# Patient Record
Sex: FEMALE | Race: BLACK OR AFRICAN AMERICAN | Employment: UNEMPLOYED | URBAN - METROPOLITAN AREA
[De-identification: names, ages, dates, MRNs, and addresses within clinical notes are randomized per-mention and may not be internally consistent; named-entity substitution may affect disease eponyms.]

---

## 2018-01-25 ENCOUNTER — HOSPITAL ENCOUNTER (INPATIENT)
Age: 33
LOS: 3 days | Discharge: HOME | DRG: 812 | End: 2018-01-28
Attending: EMERGENCY MEDICINE | Admitting: EMERGENCY MEDICINE
Payer: MEDICARE

## 2018-01-25 ENCOUNTER — APPOINTMENT (OUTPATIENT)
Dept: GENERAL RADIOLOGY | Age: 33
DRG: 812 | End: 2018-01-25
Attending: EMERGENCY MEDICINE
Payer: MEDICARE

## 2018-01-25 DIAGNOSIS — D57.00 SICKLE CELL ANEMIA WITH CRISIS (HCC): Primary | ICD-10-CM

## 2018-01-25 DIAGNOSIS — D57.01: ICD-10-CM

## 2018-01-25 PROBLEM — D57.1 SICKLE-CELL ANEMIA (HCC): Status: ACTIVE | Noted: 2018-01-25

## 2018-01-25 LAB
ANION GAP SERPL CALC-SCNC: 7 MMOL/L (ref 3–18)
BASOPHILS # BLD: 0 K/UL (ref 0–0.1)
BASOPHILS NFR BLD: 0 % (ref 0–3)
BUN SERPL-MCNC: 16 MG/DL (ref 7–18)
BUN/CREAT SERPL: 23 (ref 12–20)
CALCIUM SERPL-MCNC: 7.8 MG/DL (ref 8.5–10.1)
CHLORIDE SERPL-SCNC: 103 MMOL/L (ref 100–108)
CO2 SERPL-SCNC: 25 MMOL/L (ref 21–32)
CREAT SERPL-MCNC: 0.71 MG/DL (ref 0.6–1.3)
DIFFERENTIAL METHOD BLD: ABNORMAL
EOSINOPHIL # BLD: 0 K/UL (ref 0–0.4)
EOSINOPHIL NFR BLD: 0 % (ref 0–5)
ERYTHROCYTE [DISTWIDTH] IN BLOOD BY AUTOMATED COUNT: 27.5 % (ref 11.6–14.5)
FLUAV AG NPH QL IA: NEGATIVE
FLUBV AG NOSE QL IA: NEGATIVE
GLUCOSE SERPL-MCNC: 92 MG/DL (ref 74–99)
HCG SERPL QL: NEGATIVE
HCT VFR BLD AUTO: 12 % (ref 35–45)
HGB BLD-MCNC: 4.5 G/DL (ref 12–16)
LACTATE BLD-SCNC: 0.4 MMOL/L (ref 0.4–2)
LYMPHOCYTES # BLD: 1.9 K/UL (ref 0.8–3.5)
LYMPHOCYTES NFR BLD: 17 % (ref 20–51)
MCH RBC QN AUTO: 34.6 PG (ref 24–34)
MCHC RBC AUTO-ENTMCNC: 37.5 G/DL (ref 31–37)
MCV RBC AUTO: 92.3 FL (ref 74–97)
MONOCYTES # BLD: 1.5 K/UL (ref 0–1)
MONOCYTES NFR BLD: 13 % (ref 2–9)
MYELOCYTES NFR BLD MANUAL: 1 %
NEUTS SEG # BLD: 7.9 K/UL (ref 1.8–8)
NEUTS SEG NFR BLD: 69 % (ref 42–75)
NRBC BLD-RTO: 8 PER 100 WBC
PLATELET # BLD AUTO: 186 K/UL (ref 135–420)
PMV BLD AUTO: 8.3 FL (ref 9.2–11.8)
POTASSIUM SERPL-SCNC: 4.3 MMOL/L (ref 3.5–5.5)
RBC # BLD AUTO: 1.3 M/UL (ref 4.2–5.3)
RBC MORPH BLD: ABNORMAL
RETICS/RBC NFR AUTO: >23 % (ref 0.5–2.3)
SODIUM SERPL-SCNC: 135 MMOL/L (ref 136–145)
TROPONIN I SERPL-MCNC: <0.02 NG/ML (ref 0–0.04)
WBC # BLD AUTO: 11.4 K/UL (ref 4.6–13.2)

## 2018-01-25 PROCEDURE — 84703 CHORIONIC GONADOTROPIN ASSAY: CPT | Performed by: PHYSICIAN ASSISTANT

## 2018-01-25 PROCEDURE — 36430 TRANSFUSION BLD/BLD COMPNT: CPT

## 2018-01-25 PROCEDURE — 74011250636 HC RX REV CODE- 250/636: Performed by: PHYSICIAN ASSISTANT

## 2018-01-25 PROCEDURE — 84484 ASSAY OF TROPONIN QUANT: CPT | Performed by: PHYSICIAN ASSISTANT

## 2018-01-25 PROCEDURE — 94760 N-INVAS EAR/PLS OXIMETRY 1: CPT

## 2018-01-25 PROCEDURE — 65660000000 HC RM CCU STEPDOWN

## 2018-01-25 PROCEDURE — 94640 AIRWAY INHALATION TREATMENT: CPT

## 2018-01-25 PROCEDURE — 86920 COMPATIBILITY TEST SPIN: CPT | Performed by: PHYSICIAN ASSISTANT

## 2018-01-25 PROCEDURE — 71046 X-RAY EXAM CHEST 2 VIEWS: CPT

## 2018-01-25 PROCEDURE — 87804 INFLUENZA ASSAY W/OPTIC: CPT | Performed by: EMERGENCY MEDICINE

## 2018-01-25 PROCEDURE — 85660 RBC SICKLE CELL TEST: CPT | Performed by: PHYSICIAN ASSISTANT

## 2018-01-25 PROCEDURE — 87040 BLOOD CULTURE FOR BACTERIA: CPT | Performed by: PHYSICIAN ASSISTANT

## 2018-01-25 PROCEDURE — 86922 COMPATIBILITY TEST ANTIGLOB: CPT | Performed by: PHYSICIAN ASSISTANT

## 2018-01-25 PROCEDURE — 93005 ELECTROCARDIOGRAM TRACING: CPT

## 2018-01-25 PROCEDURE — 74011250637 HC RX REV CODE- 250/637: Performed by: EMERGENCY MEDICINE

## 2018-01-25 PROCEDURE — 96361 HYDRATE IV INFUSION ADD-ON: CPT

## 2018-01-25 PROCEDURE — 86902 BLOOD TYPE ANTIGEN DONOR EA: CPT | Performed by: PHYSICIAN ASSISTANT

## 2018-01-25 PROCEDURE — 30233N1 TRANSFUSION OF NONAUTOLOGOUS RED BLOOD CELLS INTO PERIPHERAL VEIN, PERCUTANEOUS APPROACH: ICD-10-PCS | Performed by: INTERNAL MEDICINE

## 2018-01-25 PROCEDURE — 96365 THER/PROPH/DIAG IV INF INIT: CPT

## 2018-01-25 PROCEDURE — 74011000250 HC RX REV CODE- 250: Performed by: INTERNAL MEDICINE

## 2018-01-25 PROCEDURE — 86921 COMPATIBILITY TEST INCUBATE: CPT | Performed by: PHYSICIAN ASSISTANT

## 2018-01-25 PROCEDURE — 80048 BASIC METABOLIC PNL TOTAL CA: CPT | Performed by: PHYSICIAN ASSISTANT

## 2018-01-25 PROCEDURE — 85045 AUTOMATED RETICULOCYTE COUNT: CPT | Performed by: PHYSICIAN ASSISTANT

## 2018-01-25 PROCEDURE — 86850 RBC ANTIBODY SCREEN: CPT | Performed by: PHYSICIAN ASSISTANT

## 2018-01-25 PROCEDURE — P9016 RBC LEUKOCYTES REDUCED: HCPCS | Performed by: PHYSICIAN ASSISTANT

## 2018-01-25 PROCEDURE — 99284 EMERGENCY DEPT VISIT MOD MDM: CPT

## 2018-01-25 PROCEDURE — 85025 COMPLETE CBC W/AUTO DIFF WBC: CPT | Performed by: PHYSICIAN ASSISTANT

## 2018-01-25 PROCEDURE — 96360 HYDRATION IV INFUSION INIT: CPT

## 2018-01-25 PROCEDURE — 77030013169 SET IV BLD ICUM -A

## 2018-01-25 PROCEDURE — 83605 ASSAY OF LACTIC ACID: CPT

## 2018-01-25 PROCEDURE — 74011250636 HC RX REV CODE- 250/636: Performed by: INTERNAL MEDICINE

## 2018-01-25 RX ORDER — IPRATROPIUM BROMIDE AND ALBUTEROL SULFATE 2.5; .5 MG/3ML; MG/3ML
3 SOLUTION RESPIRATORY (INHALATION) 3 TIMES DAILY
Status: DISCONTINUED | OUTPATIENT
Start: 2018-01-25 | End: 2018-01-28 | Stop reason: HOSPADM

## 2018-01-25 RX ORDER — MOXIFLOXACIN HYDROCHLORIDE 400 MG/250ML
400 INJECTION, SOLUTION INTRAVENOUS
Status: COMPLETED | OUTPATIENT
Start: 2018-01-25 | End: 2018-01-25

## 2018-01-25 RX ORDER — LEVOFLOXACIN 5 MG/ML
500 INJECTION, SOLUTION INTRAVENOUS EVERY 24 HOURS
Status: DISCONTINUED | OUTPATIENT
Start: 2018-01-25 | End: 2018-01-25

## 2018-01-25 RX ORDER — HEPARIN SODIUM 5000 [USP'U]/ML
5000 INJECTION, SOLUTION INTRAVENOUS; SUBCUTANEOUS EVERY 12 HOURS
Status: DISCONTINUED | OUTPATIENT
Start: 2018-01-25 | End: 2018-01-28 | Stop reason: HOSPADM

## 2018-01-25 RX ORDER — PANTOPRAZOLE SODIUM 40 MG/1
40 TABLET, DELAYED RELEASE ORAL
Status: DISCONTINUED | OUTPATIENT
Start: 2018-01-26 | End: 2018-01-28 | Stop reason: HOSPADM

## 2018-01-25 RX ORDER — HYDROMORPHONE HYDROCHLORIDE 2 MG/ML
0.5 INJECTION, SOLUTION INTRAMUSCULAR; INTRAVENOUS; SUBCUTANEOUS
Status: DISCONTINUED | OUTPATIENT
Start: 2018-01-25 | End: 2018-01-27

## 2018-01-25 RX ORDER — LEVOFLOXACIN 5 MG/ML
500 INJECTION, SOLUTION INTRAVENOUS EVERY 24 HOURS
Status: DISCONTINUED | OUTPATIENT
Start: 2018-01-26 | End: 2018-01-27

## 2018-01-25 RX ORDER — GUAIFENESIN 100 MG/5ML
100 SOLUTION ORAL
Status: DISCONTINUED | OUTPATIENT
Start: 2018-01-25 | End: 2018-01-28 | Stop reason: HOSPADM

## 2018-01-25 RX ORDER — ACETAMINOPHEN 325 MG/1
650 TABLET ORAL
Status: DISCONTINUED | OUTPATIENT
Start: 2018-01-25 | End: 2018-01-28 | Stop reason: HOSPADM

## 2018-01-25 RX ORDER — KETOROLAC TROMETHAMINE 15 MG/ML
15 INJECTION, SOLUTION INTRAMUSCULAR; INTRAVENOUS
Status: DISCONTINUED | OUTPATIENT
Start: 2018-01-25 | End: 2018-01-28 | Stop reason: HOSPADM

## 2018-01-25 RX ORDER — ACETAMINOPHEN 500 MG
1000 TABLET ORAL
Status: COMPLETED | OUTPATIENT
Start: 2018-01-25 | End: 2018-01-25

## 2018-01-25 RX ORDER — FOLIC ACID 1 MG/1
1 TABLET ORAL DAILY
Status: DISCONTINUED | OUTPATIENT
Start: 2018-01-26 | End: 2018-01-28 | Stop reason: HOSPADM

## 2018-01-25 RX ORDER — SODIUM CHLORIDE 9 MG/ML
250 INJECTION, SOLUTION INTRAVENOUS AS NEEDED
Status: DISCONTINUED | OUTPATIENT
Start: 2018-01-25 | End: 2018-01-28 | Stop reason: HOSPADM

## 2018-01-25 RX ADMIN — SODIUM CHLORIDE 1000 ML: 900 INJECTION, SOLUTION INTRAVENOUS at 13:10

## 2018-01-25 RX ADMIN — SODIUM CHLORIDE 500 ML: 900 INJECTION, SOLUTION INTRAVENOUS at 16:34

## 2018-01-25 RX ADMIN — SODIUM CHLORIDE 1000 ML: 900 INJECTION, SOLUTION INTRAVENOUS at 11:42

## 2018-01-25 RX ADMIN — MOXIFLOXACIN HYDROCHLORIDE 400 MG: 400 INJECTION, SOLUTION INTRAVENOUS at 15:03

## 2018-01-25 RX ADMIN — IPRATROPIUM BROMIDE AND ALBUTEROL SULFATE 3 ML: .5; 3 SOLUTION RESPIRATORY (INHALATION) at 23:54

## 2018-01-25 RX ADMIN — ACETAMINOPHEN 1000 MG: 500 TABLET ORAL at 11:42

## 2018-01-25 NOTE — ED NOTES
Informed Dr Dean Alfaro of documented BP. Dr Dean Alfaro reports if BP does not go up after 500 ml NS send to Stepdown.

## 2018-01-25 NOTE — ED NOTES
Attempted to give report.  Miles Sky RN reports unable to take report at current time R/T providing pt care; will call back to get report

## 2018-01-25 NOTE — ED PROVIDER NOTES
HPI Comments: 33 yo F with h/o SS-d c/o body aches and fatigue x 1 day. Has associated chest pain and cough. CP constant but worse with coughing. Yesterday temp of 103, but none today. Symptoms different than her usual SS crisis. Denies n/v, back pain, SOB, abdominal pain, leg swelling. No other complaints. Past Medical History:   Diagnosis Date    Sickle cell disease ([facility] [address]        History reviewed. No pertinent surgical history. History reviewed. No pertinent family history. Social History     Social History    Marital status: SINGLE     Spouse name: N/A    Number of children: N/A    Years of education: N/A     Occupational History    Not on file. Social History Main Topics    Smoking status: Never Smoker    Smokeless tobacco: Never Used    Alcohol use Not on file    Drug use: Not on file    Sexual activity: Not on file     Other Topics Concern    Not on file     Social History Narrative    No narrative on file         ALLERGIES: Penicillins    Review of Systems   Constitutional: Positive for chills, fatigue and fever. HENT: Negative for ear pain and sore throat. Respiratory: Positive for cough. Cardiovascular: Positive for chest pain. Negative for palpitations and leg swelling. Gastrointestinal: Negative for abdominal pain, nausea and vomiting. Genitourinary: Negative for dysuria. Musculoskeletal: Positive for myalgias. All other systems reviewed and are negative. Vitals:    01/25/18 1113   BP: 99/48   Pulse: 84   Resp: 16   Temp: 99.4 °F (37.4 °C)   SpO2: 98%   Weight: 49 kg (108 lb)   Height: 5' 7\" (1.702 m)            Physical Exam   Constitutional: She is oriented to person, place, and time. She appears well-developed and well-nourished. No distress. Fatigued    HENT:   Head: Normocephalic and atraumatic.    Right Ear: Tympanic membrane and ear canal normal.   Left Ear: Tympanic membrane normal.   Mouth/Throat: Uvula is midline, oropharynx is clear and moist and mucous membranes are normal.   Eyes: Conjunctivae are normal.   Neck: Normal range of motion. Neck supple. Cardiovascular: Normal rate, regular rhythm and normal heart sounds. Pulmonary/Chest: Effort normal and breath sounds normal. No respiratory distress. She has no wheezes. She has no rales. Abdominal: Soft. She exhibits no distension. There is no tenderness. There is no rebound and no guarding. Musculoskeletal: Normal range of motion. Neurological: She is alert and oriented to person, place, and time. Skin: Skin is warm and dry. There is pallor. Psychiatric: She has a normal mood and affect. Her behavior is normal. Judgment and thought content normal.   Nursing note and vitals reviewed.        MDM  Number of Diagnoses or Management Options  Sickle cell anemia with crisis [facility]):     ED Course       Procedures    -------------------------------------------------------------------------------------------------------------------     EKG INTERPRETATIONS:      RADIOLOGY RESULTS:   XR CHEST PA LAT    (Results Pending)       LABORATORY RESULTS:  Recent Results (from the past 12 hour(s))   EKG, 12 LEAD, INITIAL    Collection Time: 01/25/18 11:43 AM   Result Value Ref Range    Ventricular Rate 90 BPM    Atrial Rate 90 BPM    P-R Interval 166 ms    QRS Duration 92 ms    Q-T Interval 366 ms    QTC Calculation (Bezet) 447 ms    Calculated P Axis 60 degrees    Calculated R Axis 71 degrees    Calculated T Axis 76 degrees    Diagnosis       Normal sinus rhythm  Minimal voltage criteria for LVH, may be normal variant  T wave abnormality, consider anterior ischemia  Abnormal ECG  No previous ECGs available     INFLUENZA A & B AG (RAPID TEST)    Collection Time: 01/25/18 11:47 AM   Result Value Ref Range    Influenza A Antigen NEGATIVE  NEG      Influenza B Antigen NEGATIVE  NEG     CBC WITH AUTOMATED DIFF    Collection Time: 01/25/18 11:57 AM   Result Value Ref Range    WBC 11.4 4.6 - 13.2 K/uL    RBC 1.30 (L) 4.20 - 5.30 M/uL    HGB 4.5 (LL) 12.0 - 16.0 g/dL    HCT 12.0 (LL) 35.0 - 45.0 %    MCV 92.3 74.0 - 97.0 FL    MCH 34.6 (H) 24.0 - 34.0 PG    MCHC 37.5 (H) 31.0 - 37.0 g/dL    RDW 27.5 (H) 11.6 - 14.5 %    PLATELET 419 191 - 539 K/uL    MPV 8.3 (L) 9.2 - 11.8 FL    NEUTROPHILS PENDING %    LYMPHOCYTES PENDING %    MONOCYTES PENDING %    EOSINOPHILS PENDING %    BASOPHILS PENDING %    ABS. NEUTROPHILS PENDING K/UL    ABS. LYMPHOCYTES PENDING K/UL    ABS. MONOCYTES PENDING K/UL    ABS. EOSINOPHILS PENDING K/UL    ABS. BASOPHILS PENDING K/UL    DF PENDING    METABOLIC PANEL, BASIC    Collection Time: 01/25/18 11:57 AM   Result Value Ref Range    Sodium 135 (L) 136 - 145 mmol/L    Potassium 4.3 3.5 - 5.5 mmol/L    Chloride 103 100 - 108 mmol/L    CO2 25 21 - 32 mmol/L    Anion gap 7 3.0 - 18 mmol/L    Glucose 92 74 - 99 mg/dL    BUN 16 7.0 - 18 MG/DL    Creatinine 0.71 0.6 - 1.3 MG/DL    BUN/Creatinine ratio 23 (H) 12 - 20      GFR est AA >60 >60 ml/min/1.73m2    GFR est non-AA >60 >60 ml/min/1.73m2    Calcium 7.8 (L) 8.5 - 10.1 MG/DL   RETICULOCYTE COUNT    Collection Time: 01/25/18 11:57 AM   Result Value Ref Range    Reticulocyte count >23.0 (H) 0.5 - 2.3 %   TROPONIN I    Collection Time: 01/25/18 11:57 AM   Result Value Ref Range    Troponin-I, Qt. <0.02 0.0 - 0.045 NG/ML         PROGRESS NOTES:    12:46 PM Called by lab for panic hgb/hct. Retic count >23. Trop negative. EKG with T-wave inversions in V2-4. Per notes from [facility] facilities T-wave inversions present on previous EKGs. CXR with ?developing infiltrate. D/w Dr. Katerine Limon, will treat for ACS with moxifloxicin. 2 units blood ordered from ED. Paged hospitalist for admission. 1:38 PM Discussed with Dr. Faye Chavez originally, but pt actually Dr. Yosvany Ospina admission. Spoke with Dr. Yosvany Ospina who will admit. DISPOSITION:  ED DIAGNOSIS & DISPOSITION INFORMATION  Diagnosis:   1.  Sickle cell anemia with crisis [facility])          Disposition: admit    Follow-up Information None          Patient's Medications    No medications on file

## 2018-01-25 NOTE — ED NOTES
Blood bank called and stated matching units of blood for pt would take \"quite some time\" due to blood needed and antibodies required. Alyssa Shafer notified. Sharad Finn RN notified as well as Yareli Silva RN.

## 2018-01-25 NOTE — PROGRESS NOTES
attempted to complete the initial Spiritual Assessment of the patient in bed 11 of the emergency room and offer Pastoral Care support but found patient resting peacefully at present. Family member was setting outside patient door and so I spoke with them. . Patient does not have any [place]ian/cultural needs that will affect patients preferences in health care.  Chaplains will continue to follow and will provide pastoral care on an as needed/requested basis     Chaplain Ze Harper   Board Certified [address] [place] [address]   [phone]

## 2018-01-25 NOTE — ED NOTES
TRANSFER - OUT REPORT:    Verbal report given to KLEVER Dominguez RN(name) on Susy Montanez  being transferred to [address] [place] [address](unit) for routine progression of care       Report consisted of patients Situation, Background, Assessment and   Recommendations(SBAR). Information from the following report(s) ED Summary, Intake/Output, Recent Results and Cardiac Rhythm SB/SR was reviewed with the receiving nurse. Lines:   Peripheral IV 01/25/18 Left Forearm (Active)   Site Assessment Clean, dry, & intact 1/25/2018 12:04 PM   Phlebitis Assessment 0 1/25/2018 12:04 PM   Infiltration Assessment 0 1/25/2018 12:04 PM   Dressing Status Clean, dry, & intact 1/25/2018 12:04 PM       Peripheral IV 01/25/18 Right Forearm (Active)   Site Assessment Clean, dry, & intact 1/25/2018 12:48 PM   Phlebitis Assessment 0 1/25/2018 12:48 PM   Infiltration Assessment 0 1/25/2018 12:48 PM   Dressing Status Clean, dry, & intact 1/25/2018 12:48 PM        Opportunity for questions and clarification was provided.       Patient transported with:   O2 @ 3 L NC liters,  Monitor, RN

## 2018-01-25 NOTE — IP AVS SNAPSHOT
[address] [address] 
[phone] Patient: Catrina Reilly MRN: [id number] NPZ:[id number] About your hospitalization You were admitted on:  January 25, 2018 You last received care in the:  [address] [place] [address] You were discharged on:  January 28, 2018 Why you were hospitalized Your primary diagnosis was:  Not on File Your diagnoses also included:  Sickle-Cell Anemia (Hcc), Sickle Cell Crisis Acute Chest Syndrome (Hcc), Asthma, Htn (Hypertension), Vitamin D Deficiency Follow-up Information Follow up With Details Comments Contact Info Kashif Botello MD Schedule an appointment as soon as possible for a visit in 3 days Follow Up [address] [facility] [address] [phone] 
[phone] Discharge Orders None A check ronald indicates which time of day the medication should be taken. My Medications Notice You have not been prescribed any medications. Discharge Instructions Patient armband removed and shredded. DISCHARGE SUMMARY from Nurse PATIENT INSTRUCTIONS: 
 
What to do at Home: 
Recommended activity: Activity as tolerated. If you experience any of the following symptoms nausea, vomiting, diahrrea, difficulty breathing, shortness of breath, bleeding or temperature greater than 100.5 please follow up with your primary care provider of call 911. *  Please give a list of your current medications to your Primary Care Provider. *  Please update this list whenever your medications are discontinued, doses are 
    changed, or new medications (including over-the-counter products) are added. *  Please carry medication information at all times in case of emergency situations. These are general instructions for a healthy lifestyle: No smoking/ No tobacco products/ Avoid exposure to second hand smoke Surgeon General's Warning:  Quitting smoking now greatly reduces serious risk to your health. Obesity, smoking, and sedentary lifestyle greatly increases your risk for illness A healthy diet, regular physical exercise & weight monitoring are important for maintaining a healthy lifestyle You may be retaining fluid if you have a history of heart failure or if you experience any of the following symptoms:  Weight gain of 3 pounds or more overnight or 5 pounds in a week, increased swelling in our hands or feet or shortness of breath while lying flat in bed. Please call your doctor as soon as you notice any of these symptoms; do not wait until your next office visit. Recognize signs and symptoms of STROKE: 
 
F-face looks uneven A-arms unable to move or move unevenly S-speech slurred or non-existent T-time-call 911 as soon as signs and symptoms begin-DO NOT go Back to bed or wait to see if you get better-TIME IS BRAIN. Warning Signs of HEART ATTACK Call 911 if you have these symptoms: 
? Chest discomfort. Most heart attacks involve discomfort in the center of the chest that lasts more than a few minutes, or that goes away and comes back. It can feel like uncomfortable pressure, squeezing, fullness, or pain. ? Discomfort in other areas of the upper body. Symptoms can include pain or discomfort in one or both arms, the back, neck, jaw, or stomach. ? Shortness of breath with or without chest discomfort. ? Other signs may include breaking out in a cold sweat, nausea, or lightheadedness. Don't wait more than five minutes to call [address]! Fast action can save your life. Calling 911 is almost always the fastest way to get lifesaving treatment. Emergency Medical Services staff can begin treatment when they arrive  up to an hour sooner than if someone gets to the hospital by car. The discharge information has been reviewed with the patient.   The patient verbalized understanding. Discharge medications reviewed with the patient and appropriate educational materials and side effects teaching were provided. ___________________________________________________________________________________________________________________________________ Introducing [facility]! Peña Solis introduces [employer] patient portal. Now you can access parts of your medical record, email your doctor's office, and request medication refills online. 1. In your internet browser, go to [email or web address] 2. Click on the First Time User? Click Here link in the Sign In box. You will see the New Member Sign Up page. 3. Enter your [employer] Access Code exactly as it appears below. You will not need to use this code after youve completed the sign-up process. If you do not sign up before the expiration date, you must request a new code. · [employer] Access Code: [email or web address] Expires: 4/28/2018  2:09 PM 
 
4. Enter the last four digits of your Social Security Number (xxxx) and Date of Birth (mm/dd/yyyy) as indicated and click Submit. You will be taken to the next sign-up page. 5. Create a [employer] ID. This will be your [employer] login ID and cannot be changed, so think of one that is secure and easy to remember. 6. Create a [employer] password. You can change your password at any time. 7. Enter your Password Reset Question and Answer. This can be used at a later time if you forget your password. 8. Enter your e-mail address. You will receive e-mail notification when new information is available in [address] 9. Click Sign Up. You can now view and download portions of your medical record. 10. Click the Download Summary menu link to download a portable copy of your medical information. If you have questions, please visit the Frequently Asked Questions section of the [employer] website.  Remember, [employer] is NOT to be used for urgent needs. For medical emergencies, dial 911. Now available from your iPhone and Android! Unresulted Labs-Please follow up with your PCP about these lab tests Order Current Status CULTURE, BLOOD Preliminary result CULTURE, BLOOD Preliminary result Providers Seen During Your Hospitalization Provider Specialty Primary office phone Vernon Zhang  Emergency Medicine [phone] Tristan Sanchez MD Emergency Medicine [phone] Laith Jason MD Geriatric Medicine [phone] Your Primary Care Physician (PCP) Primary Care Physician Office Phone Office Fax Arlyce Lanes [phone] You are allergic to the following Allergen Reactions Penicillins Shortness of Breath Recent Documentation Height Weight BMI OB Status Smoking Status 1.702 m 50.8 kg 17.56 kg/m2 Having regular periods Never Smoker Emergency Contacts Name Discharge Info Relation Home Work Mobile Pao Magdaleno DISCHARGE CAREGIVER [3] Other Relative [6] [phone] Patient Belongings The following personal items are in your possession at time of discharge: 
  Dental Appliances: None  Visual Aid: None      Home Medications: None   Jewelry: None  Clothing: At bedside, Pants, Shirt, Undergarments, With patient    Other Valuables: Cell Phone () Discharge Instructions Attachments/References ANEMIA (ENGLISH) Patient Handouts Anemia: Care Instructions Your Care Instructions Anemia is a low level of red blood cells, which carry oxygen throughout your body. Many things can cause anemia. Lack of iron is one of the most common causes. Your body needs iron to make hemoglobin, a substance in red blood cells that carries oxygen from the lungs to your body's cells. Without enough iron, the body produces fewer and smaller red blood cells.  As a result, your body's cells do not get enough oxygen, and you feel tired and weak. And you may have trouble concentrating. Bleeding is the most common cause of a lack of iron. You may have heavy menstrual bleeding or bleeding caused by conditions such as ulcers, hemorrhoids, or cancer. Regular use of aspirin or other anti-inflammatory medicines (such as ibuprofen) also can cause bleeding in some people. A lack of iron in your diet also can cause anemia, especially at times when the body needs more iron, such as during pregnancy, infancy, and the teen years. Your doctor may have prescribed iron pills. It may take several months of treatment for your iron levels to return to normal. Your doctor also may suggest that you eat foods that are rich in iron, such as meat and beans. There are many other causes of anemia. It is not always due to a lack of iron. Finding the specific cause of your anemia will help your doctor find the right treatment for you. Follow-up care is a key part of your treatment and safety. Be sure to make and go to all appointments, and call your doctor if you are having problems. It's also a good idea to know your test results and keep a list of the medicines you take. How can you care for yourself at home? · Take your medicines exactly as prescribed. Call your doctor if you think you are having a problem with your medicine. · If your doctor recommends iron pills, take them as directed: ¨ Try to take the pills on an empty stomach about 1 hour before or 2 hours after meals. But you may need to take iron with food to avoid an upset stomach. ¨ Do not take antacids or drink milk or caffeine drinks (such as coffee, tea, or cola) at the same time or within 2 hours of the time that you take your iron. They can make it hard for your body to absorb the iron. ¨ Vitamin C (from food or supplements) helps your body absorb iron. Try taking iron pills with a glass of orange juice or some other food that is high in vitamin C, such as citrus fruits. ¨ Iron pills may cause stomach problems, such as heartburn, nausea, diarrhea, constipation, and cramps. Be sure to drink plenty of fluids, and include fruits, vegetables, and fiber in your diet each day. Iron pills often make your bowel movements dark or green. ¨ If you forget to take an iron pill, do not take a double dose of iron the next time you take a pill. ¨ Keep iron pills out of the reach of small children. An overdose of iron can be very dangerous. · Follow your doctor's advice about eating iron-rich foods. These include red meat, shellfish, poultry, eggs, beans, raisins, whole-grain bread, and leafy green vegetables. · Steam vegetables to help them keep their iron content. When should you call for help? Call 911 anytime you think you may need emergency care. For example, call if: 
? · You have symptoms of a heart attack. These may include: ¨ Chest pain or pressure, or a strange feeling in the chest. 
¨ Sweating. ¨ Shortness of breath. ¨ Nausea or vomiting. ¨ Pain, pressure, or a strange feeling in the back, neck, jaw, or upper belly or in one or both shoulders or arms. ¨ Lightheadedness or sudden weakness. ¨ A fast or irregular heartbeat. After you call 911, the  may tell you to chew 1 adult-strength or 2 to 4 low-dose aspirin. Wait for an ambulance. Do not try to drive yourself. ? · You passed out (lost consciousness). ?Call your doctor now or seek immediate medical care if: 
? · You have new or increased shortness of breath. ? · You are dizzy or lightheaded, or you feel like you may faint. ? · Your fatigue and weakness continue or get worse. ? · You have any abnormal bleeding, such as: 
¨ Nosebleeds. ¨ Vaginal bleeding that is different (heavier, more frequent, at a different time of the month) than what you are used to. ¨ Bloody or black stools, or rectal bleeding. ¨ Bloody or pink urine. ? Watch closely for changes in your health, and be sure to contact your doctor if: 
? · You do not get better as expected. Where can you learn more? Go to http://jono-salvatore.info/. Enter R301 in the search box to learn more about \"Anemia: Care Instructions. \" Current as of: October 13, 2016 Content Version: 11.4 © 5171-2860 [employer]. Care instructions adapted under license by [employer] (which disclaims liability or warranty for this information). If you have questions about a medical condition or this instruction, always ask your healthcare professional. [address] any warranty or liability for your use of this information. Please provide this summary of care documentation to your next provider. Signatures-by signing, you are acknowledging that this After Visit Summary has been reviewed with you and you have received a copy. Patient Signature:  ____________________________________________________________ Date:  ____________________________________________________________  
  
Shea Moritz Provider Signature:  ____________________________________________________________ Date:  ____________________________________________________________

## 2018-01-26 PROBLEM — E55.9 VITAMIN D DEFICIENCY: Status: ACTIVE | Noted: 2018-01-26

## 2018-01-26 PROBLEM — J45.909 ASTHMA: Status: ACTIVE | Noted: 2018-01-26

## 2018-01-26 PROBLEM — I10 HTN (HYPERTENSION): Status: ACTIVE | Noted: 2018-01-26

## 2018-01-26 LAB
ABO + RH BLD: NORMAL
ANTIGENS PRESENT RBC DONR: NORMAL
ANTIGENS PRESENT RBC DONR: NORMAL
ATRIAL RATE: 90 BPM
BASOPHILS # BLD: 0 K/UL (ref 0–0.1)
BASOPHILS NFR BLD: 0 % (ref 0–3)
BLD PROD TYP BPU: NORMAL
BLD PROD TYP BPU: NORMAL
BLOOD GROUP ANTIBODIES SERPL: NORMAL
BPU ID: NORMAL
BPU ID: NORMAL
CALCULATED P AXIS, ECG09: 60 DEGREES
CALCULATED R AXIS, ECG10: 71 DEGREES
CALCULATED T AXIS, ECG11: 76 DEGREES
CALLED TO:,BCALL1: NORMAL
CROSSMATCH RESULT,%XM: NORMAL
CROSSMATCH RESULT,%XM: NORMAL
DIAGNOSIS, 93000: NORMAL
DIFFERENTIAL METHOD BLD: ABNORMAL
EOSINOPHIL # BLD: 0 K/UL (ref 0–0.4)
EOSINOPHIL NFR BLD: 0 % (ref 0–5)
ERYTHROCYTE [DISTWIDTH] IN BLOOD BY AUTOMATED COUNT: 20.7 % (ref 11.6–14.5)
HCT VFR BLD AUTO: 18.2 % (ref 35–45)
HGB BLD-MCNC: 6.6 G/DL (ref 12–16)
LYMPHOCYTES # BLD: 0.9 K/UL (ref 0.8–3.5)
LYMPHOCYTES NFR BLD: 9 % (ref 20–51)
MCH RBC QN AUTO: 31.6 PG (ref 24–34)
MCHC RBC AUTO-ENTMCNC: 36.3 G/DL (ref 31–37)
MCV RBC AUTO: 87.1 FL (ref 74–97)
MONOCYTES # BLD: 1.2 K/UL (ref 0–1)
MONOCYTES NFR BLD: 12 % (ref 2–9)
NEUTS SEG # BLD: 8.1 K/UL (ref 1.8–8)
NEUTS SEG NFR BLD: 79 % (ref 42–75)
NRBC BLD-RTO: 9 PER 100 WBC
P-R INTERVAL, ECG05: 166 MS
PHYSICIAN INSTRUCTIO,%PI: NORMAL
PLATELET # BLD AUTO: 211 K/UL (ref 135–420)
PMV BLD AUTO: 9.2 FL (ref 9.2–11.8)
Q-T INTERVAL, ECG07: 366 MS
QRS DURATION, ECG06: 92 MS
QTC CALCULATION (BEZET), ECG08: 447 MS
RBC # BLD AUTO: 2.09 M/UL (ref 4.2–5.3)
RBC MORPH BLD: ABNORMAL
SPECIMEN EXP DATE BLD: NORMAL
STATUS OF UNIT,%ST: NORMAL
STATUS OF UNIT,%ST: NORMAL
UNIT DIVISION, %UDIV: 0
UNIT DIVISION, %UDIV: 0
VENTRICULAR RATE, ECG03: 90 BPM
WBC # BLD AUTO: 10.2 K/UL (ref 4.6–13.2)

## 2018-01-26 PROCEDURE — 65660000000 HC RM CCU STEPDOWN

## 2018-01-26 PROCEDURE — 36415 COLL VENOUS BLD VENIPUNCTURE: CPT | Performed by: INTERNAL MEDICINE

## 2018-01-26 PROCEDURE — 85025 COMPLETE CBC W/AUTO DIFF WBC: CPT | Performed by: INTERNAL MEDICINE

## 2018-01-26 PROCEDURE — 74011250637 HC RX REV CODE- 250/637: Performed by: INTERNAL MEDICINE

## 2018-01-26 PROCEDURE — 74011000250 HC RX REV CODE- 250: Performed by: INTERNAL MEDICINE

## 2018-01-26 PROCEDURE — 74011250636 HC RX REV CODE- 250/636: Performed by: INTERNAL MEDICINE

## 2018-01-26 RX ORDER — METHADONE HYDROCHLORIDE 10 MG/ML
35 CONCENTRATE ORAL DAILY
Status: DISCONTINUED | OUTPATIENT
Start: 2018-01-26 | End: 2018-01-28 | Stop reason: HOSPADM

## 2018-01-26 RX ADMIN — KETOROLAC TROMETHAMINE 15 MG: 15 INJECTION, SOLUTION INTRAMUSCULAR; INTRAVENOUS at 10:33

## 2018-01-26 RX ADMIN — FOLIC ACID 1 MG: 1 TABLET ORAL at 09:54

## 2018-01-26 RX ADMIN — METHADONE HYDROCHLORIDE 35 MG: 10 CONCENTRATE ORAL at 12:37

## 2018-01-26 RX ADMIN — LEVOFLOXACIN 500 MG: 5 INJECTION, SOLUTION INTRAVENOUS at 16:37

## 2018-01-26 RX ADMIN — IPRATROPIUM BROMIDE AND ALBUTEROL SULFATE 3 ML: .5; 3 SOLUTION RESPIRATORY (INHALATION) at 16:37

## 2018-01-26 RX ADMIN — KETOROLAC TROMETHAMINE 15 MG: 15 INJECTION, SOLUTION INTRAMUSCULAR; INTRAVENOUS at 16:37

## 2018-01-26 NOTE — PROGRESS NOTES
[facility]/[address]   Discharge Planning/ Assessment    Reasons for Intervention: Chart reviewed. Met with pt., verified all demographics. [facility] has [employer] dual ins. [facility] Dr. Martha Cyr. Danika Adam, Aunt: [phone]. States lives alone. Uses no DME. Independent with ADL's prior to admit. PLAN: home when medically stable. Will cont to follow. Rox Rey RN,ext. [phone].      High Risk Criteria  [x] Yes  []No   Physician Referral  [] Yes  [x]No        Date    Nursing Referral  [] Yes  [x]No        Date    Patient/Family Request  [] Yes  [x]No        Date       Resources:    Medicare  [x] Yes  []No   Medicaid  [x] Yes  []No   No Resources  [] Yes  [x]No   Private Insurance  [] Yes  [x]No    Name/Phone Number    Other  [] Yes  [x]No        (i.e. Workman's Comp)         Prior Services:    Prior Services  [] Yes  [x]No   Home Health  [] Yes  [x]No   [facility]  [] Yes  [x]No        [facility] of [address] Program  [] Yes  [x]No       Meals on Wheels  [] Yes  [x]No   Office on Aging  [] Yes  [x]No   Transportation Services  [] Yes  [x]No   Nursing Home  [] Yes  [x]No        Nursing Home Name    [facility]  [] Yes  [x]No        [address] Name    Other       Information Source:      Information obtained from  [x] Patient  [] Parent   [] [address]  [] Child  [] Spouse   [] Significant Other/Partner   [] Friend      [] EMS    [] Nursing Home Chart          [] Other:   Chart Review  [x] Yes  []No     Family/Support System:    Patient lives with  [x] Alone    [] Spouse   [] Significant Other  [] Children  [] Caretaker   [] Parent  [] Sibling     [] Other       Other Support System:    Is the patient responsible for care of others  [] Yes  [x]No   Information of person caring for patient on  discharge    Managers financial affairs independently  [] Yes  [x]No   If no, explain:      Status Prior to Admission:    Mental Status  [x] Awake [x] Alert  [x] Oriented  [x] Quiet/Calm [] Lethargic/Sedated   [] Disoriented  [] Restless/Anxious  [] Combative   Personal Care  [] Dependent  [x] Independent Personal Care  [] Requires Assistance   Meal Preparation Ability  [x] Independent   [] Standby Assistance   [] Minimal Assistance   [] Moderate Assistance  [] Maximum Assistance     [] Total Assistance   Chores  [x] Independent with Chores   [] N/A Nursing Home Resident   [] Requires Assistance   Bowel/Bladder  [x] Continent  [] Catheter  [] Incontinent  [] Ostomy Self-Care    [] Urine Diversion Self-Care  [] Maximum Assistance     [] Total Assistance   Number of Persons needed for assistance    DME at home  [] Marshell Hurst, Bernabe Lanes  [] Latisha Hess   [] Commode    [] Bathroom/Grab Bars  [] Hospital Bed  [] Nebulizer  [] Oxygen           [] Raised Toilet Seat  [] Shower Chair  [] Side Rails for Bed   [] Tub Transfer Bench   [] Teto Steward  [] Jose Cruz Sheriff      [] Other:   Vendor      Treatment Presently Receiving:    Current Treatments  [] Chemotherapy  [] Dialysis  [] Insulin  [] IVAB [x] IVF   [] O2  [] PCA   [] PT   [] RT   [] Tube Feedings   [] Wound Care     Psychosocial Evaluation:    Verbalized Knowledge of Disease Process  [] Patient  []Family   Coping with Disease Process  [] Patient  []Family   Requires Further Counseling Coping with Disease Process  [] Patient  []Family     Identified Projected Needs:    Home Health Aid  [] Yes  [x]No   Transportation  [] Yes  [x]No   Education  [] Yes  [x]No        Specific Education     Financial Counseling  [] Yes  [x]No   Inability to Care for Self/Will Require 24 hour care  [] Yes  [x]No   Pain Management  [] Yes  [x]No   Home Infusion Therapy  [] Yes  [x]No   Oxygen Therapy  [] Yes  [x]No   DME  [] Yes  [x]No   Long Term Care Placement  [] Yes  [x]No   Rehab  [] Yes  [x]No   Physical Therapy  [] Yes  [x]No   Needs Anticipated At This Time  [] Yes  [x]No     Intra-Hospital Referral:    [facility]  [] Yes  [x]No     [] Yes  [x]No   Patient Representative  [] Yes  [x]No   Staff for Teaching Needs  [] Yes  [x]No   Specialty Teaching Needs     Diabetic Educator  [] Yes  [x]No   Referral for Diabetic Educator Needed  [] Yes  [x]No  If Yes, place order for Nutritionist or Diabetic Consult     Tentative Discharge Plan:    Home with No Services  [x] Yes  []No   Home with [address]  [] Yes  [x]No        If Yes, specify type    [address]  [] Yes  [x]No        If Yes, specify type    Meals on Wheels  [] Yes  [x]No   Office of Aging  [] Yes  [x]No   NHP  [] Yes  [x]No   Return to the Nursing Home  [] Yes  [x]No   Rehab Therapy  [] Yes  [x]No   Acute Rehab  [] Yes  [x]No   Subacute Rehab  [] Yes  [x]No   Private Care  [] Yes  [x]No   Substance Abuse Referral  [] Yes  [x]No   Transportation  [] Yes  [x]No   Chore Service  [] Yes  [x]No   Inpatient Hospice  [] Yes  [x]No   OP RT  [] Yes  [x] No   OP Hemo  [] Yes  [x] No   OP PT  [] Yes  [x]No   Support Group  [] Yes  [x]No   Reach to Recovery  [] Yes  [x]No   OP Oncology Clinic  [] Yes  [x]No   Clinic Appointment  [] Yes  [x]No   DME  [] Yes  [x]No   Comments    Name of D/C Planner or  Given to Patient or Family Lottie Luciano   Phone Number Pager: [phone]  [phone]    Date 1-   Time    If you are discharged home, whom do you designate to participate in your discharge plan and receive any information needed?      Enter name of designee         Phone # of designee         Address of designee         Updated         Patient refused to designate any           individual Declined

## 2018-01-26 NOTE — ED NOTES
[facility] One unit of whole blood complete, one more ready to pickup in blood bank.

## 2018-01-26 NOTE — ACP (ADVANCE CARE PLANNING)
Patient has designated ________________________ to participate in his/her discharge plan and to receive any needed information.      Name:   Address:  Phone number:    Claritza Forbess

## 2018-01-26 NOTE — PROGRESS NOTES
Received bedside shift report from Alessandro Rogers RN. Pt is asleep in bed, safety measures in place. White board updated, call bell within reach. 0925 Pt went for walk with CNA. 56 Spoke with pharmacy, stating that they are making the methadone now. 1226 Picked up methadone from pharmacy. 1237 Administered methadone to pt.     1412 Pt asleep in bed, NAD noted. 1800 Pt states she wants to take a nap, stating she hasn't gotten good sleep since about 5am this morning. Bedside shift change report given to DERRICK Sheth (oncoming nurse) by Stephanie Briggs RN (offgoing nurse). Report included the following information SBAR, Kardex, ED Summary, Intake/Output, MAR and Cardiac Rhythm NSR.

## 2018-01-26 NOTE — ED NOTES
Spoke to Dr Nitza Boone about giving pt heparin with Low H/H. Dr Nitza Boone reports sickle cell pts have a tendency to form clots/give patient heparin as ordered. Also informed that pt reports run low BP/better BP when cuff on leg.; BP has been running 1teens -120's while on leg.  No new orders given

## 2018-01-26 NOTE — PROGRESS NOTES
General Internal Medicine/Geriatrics    Patient: Jorge Ashton MRN: [id number]  CSN: [id number]    YOB: 1985  Age: 34 y.o.   Sex: female    DOA: 1/25/2018 LOS:  LOS: 1 day                    Subjective:     Pain some better  No new c/o    Review of Systems:  Eyes: negative  Ears, Nose, Mouth, Throat, and Face: negative  Respiratory: negative for dyspnea on exertion  Cardiovascular: negative for chest pain  Gastrointestinal: negative for nausea, vomiting and diarrhea  Genitourinary:negative for dysuria and hematuria  Integument/Breast: negative  Hematologic/Lymphatic: negative for bleeding  Musculoskeletal:negative for arthralgias  Neurological: negative for weakness  Behavioral/Psychiatric: negative for behavior problems  Endocrine: negative for temperature intolerance  Allergic/Immunologic: negative for hay fever    Objective:     Physical Exam:  Patient Vitals for the past 24 hrs:   Temp Pulse Resp BP SpO2   01/26/18 1616 97.8 °F (36.6 °C) 64 16 112/63 92 %   01/26/18 1240 97.8 °F (36.6 °C) 60 15 122/53 100 %   01/26/18 0806 97.9 °F (36.6 °C) 66 16 120/50 90 %   01/26/18 0430 97.2 °F (36.2 °C) 66 18 116/62 94 %   01/25/18 2354 - - - - 91 %   01/25/18 2320 97.5 °F (36.4 °C) (!) 59 18 110/49 96 %   01/25/18 2220 97.6 °F (36.4 °C) 66 18 107/54 96 %   01/25/18 2150 97.4 °F (36.3 °C) 66 18 143/71 95 %   01/25/18 2120 97 °F (36.1 °C) 62 18 119/56 96 %   01/25/18 2108 97.2 °F (36.2 °C) 69 18 111/47 95 %   01/25/18 2040 97.5 °F (36.4 °C) 76 19 108/49 98 %   01/25/18 1915 - (!) 54 19 127/55 96 %   01/25/18 1900 - (!) 50 - 117/49 98 %   01/25/18 1845 - (!) 50 16 121/49 98 %   01/25/18 1830 - (!) 49 17 121/45 97 %   01/25/18 1815 - (!) 51 20 121/48 96 %   01/25/18 1800 - 67 21 123/51 94 %   01/25/18 1745 - (!) 52 17 108/47 93 %   01/25/18 1730 - (!) 50 16 109/45 94 %   01/25/18 1715 - (!) 49 16 110/44 95 %     Af, VSS    HEENT: wnl  NECK:  No venous distention or adenopathy   HEART: RRR, no rubs or gallops  LUNGS: Clear to auscultation  ABDOMEN: soft with active BS. No tenderness, no distention, no organomegaly  EXT: warm,no edema  SKIN: Normal turgor, no breaks  NEURO: Awake, alert, non focal    Intake and Output:  Current Shift:  01/26 0701 - 01/26 1900  In: 480 [P.O.:480]  Out: -   Last three shifts:  01/24 1901 - 01/26 0700  In: 270.8   Out: -     Recent Results (from the past 24 hour(s))   CBC WITH AUTOMATED DIFF    Collection Time: 01/26/18  5:48 AM   Result Value Ref Range    WBC 10.2 4.6 - 13.2 K/uL    RBC 2.09 (L) 4.20 - 5.30 M/uL    HGB 6.6 (L) 12.0 - 16.0 g/dL    HCT 18.2 (L) 35.0 - 45.0 %    MCV 87.1 74.0 - 97.0 FL    MCH 31.6 24.0 - 34.0 PG    MCHC 36.3 31.0 - 37.0 g/dL    RDW 20.7 (H) 11.6 - 14.5 %    PLATELET 734 626 - 292 K/uL    MPV 9.2 9.2 - 11.8 FL    NEUTROPHILS 79 (H) 42 - 75 %    LYMPHOCYTES 9 (L) 20 - 51 %    MONOCYTES 12 (H) 2 - 9 %    EOSINOPHILS 0 0 - 5 %    BASOPHILS 0 0 - 3 %    NRBC 9.0 (H) 0  WBC    ABS. NEUTROPHILS 8.1 (H) 1.8 - 8.0 K/UL    ABS. LYMPHOCYTES 0.9 0.8 - 3.5 K/UL    ABS. MONOCYTES 1.2 (H) 0 - 1.0 K/UL    ABS. EOSINOPHILS 0.0 0.0 - 0.4 K/UL    ABS. BASOPHILS 0.0 0.0 - 0.1 K/UL    RBC COMMENTS POLYCHROMASIA  1+        RBC COMMENTS SICKLE CELLS  3+        RBC COMMENTS ANISOCYTOSIS  1+        RBC COMMENTS HYPOCHROMIA  1+        DF MANUAL         No results found.     Current Facility-Administered Medications   Medication Dose Route Frequency    methadone (DOLOPHINE) 10 mg/mL concentrated solution 35 mg  35 mg Oral DAILY    0.9% sodium chloride infusion 250 mL  250 mL IntraVENous PRN    HYDROmorphone (DILAUDID) injection 0.5 mg  0.5 mg IntraVENous Q4H PRN    acetaminophen (TYLENOL) tablet 650 mg  650 mg Oral Q6H PRN    heparin (porcine) injection 5,000 Units  5,000 Units SubCUTAneous Z28T    folic acid (FOLVITE) tablet 1 mg  1 mg Oral DAILY    ketorolac (TORADOL) injection 15 mg  15 mg IntraVENous Q6H PRN    pantoprazole (PROTONIX) tablet 40 mg  40 mg Oral ACB    albuterol-ipratropium (DUO-NEB) 2.5 MG-0.5 MG/3 ML  3 mL Nebulization TID    guaiFENesin (ROBITUSSIN) 100 mg/5 mL oral liquid 100 mg  100 mg Oral Q4H PRN    levoFLOXacin (LEVAQUIN) 500 mg in D5W IVPB  500 mg IntraVENous Q24H       Lab Results   Component Value Date/Time    Glucose 92 01/25/2018 11:57 AM    Glucose 110 12/02/2011 04:10 AM    Glucose 88 11/30/2011 07:13 PM        Assessment     Active Problems:    Sickle-cell anemia (HCC) (1/25/2018)      Sickle cell crisis acute chest syndrome ([address] (1/25/2018)      Asthma (1/26/2018)      HTN (hypertension) (1/26/2018)      Vitamin D deficiency (1/26/2018)        Plan     Hgb. Improved after transfusion  Pain better  Methadone restarted  Continue hydration  Continue other meds.           Mitchell Carmichael MD  1/26/2018, 5:05 PM

## 2018-01-26 NOTE — PROGRESS NOTES
Nutrition initial assessment/Plan of care    RECOMMENDATIONS:     1. Regular diet  2. Ensure Enlive TID  3. Monitor weight, labs and PO intake  4. RD to follow     GOALS:     1. PO intake meets >75% of protein/calorie needs by 1/31  2. Gradual weight gain (+/- 1-2 lb by 2/1)      ASSESSMENT:     Weight status is classified as underweight per BMI of 16.9. PO intake is fair. Added Ensure Enlive TID for additional calories/protein. Labs noted. Nutrition recommendations listed. RD to follow. Nutrition Diagnoses:   Underweight related to inadequate energy intake as evidenced by BMI of 16.9. Nutrition Risk:  [] High  [x] Moderate []  Low    SUBJECTIVE/OBJECTIVE:      Patient admitted with sickle cell anemia crisis. Patient reports eating well at home and weight has been stable. Weight stable per documented weights (106 lb on 7/25/17). Observed 25% intake of lunch meal during visit. Patient states she didn't like what was served. Obtained food preferences. Agreeable to dietary supplements with meals and prefers chocolate flavor. No food allergies or problems with chewing/swallowing. Encouraged adequate intake. Will monitor. Information Obtained from:    [x] Chart Review   [x] Patient   [] Family/Caregiver   [] Nurse/Physician   [] Interdisciplinary Meeting/Rounds    Diet: Regular diet  Medications: [x] Reviewed    Allergies: [x] Reviewed   Encounter Diagnoses     ICD-10-CM ICD-9-CM   1. Sickle cell anemia with crisis (HCC) D57.00 282.62   2.  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) D57.01 282.62     517.3     Past Medical History:   Diagnosis Date    Sickle cell disease ([facility]       Labs:    Lab Results   Component Value Date/Time    Sodium 135 01/25/2018 11:57 AM    Potassium 4.3 01/25/2018 11:57 AM    Chloride 103 01/25/2018 11:57 AM    CO2 25 01/25/2018 11:57 AM    Anion gap 7 01/25/2018 11:57 AM    Glucose 92 01/25/2018 11:57 AM    BUN 16 01/25/2018 11:57 AM    Creatinine 0.71 01/25/2018 11:57 AM    Calcium 7.8 01/25/2018 11:57 AM     Anthropometrics: BMI (calculated): 16.9  Last 3 Recorded Weights in this Encounter    01/25/18 1113   Weight: 49 kg (108 lb)      Ht Readings from Last 1 Encounters:   01/25/18 5' 7\" (1.702 m)     Patient Vitals for the past 100 hrs:   % Diet Eaten   01/26/18 0806 50 %     IBW: 135 lb %IBW: 80%  [] Weight Loss [] Weight Gain [x] Weight Stable    Estimated Nutrition Needs: [x] MSJ    Calories: 7263-9984 Kcal Based on:   [x] Actual BW    Protein:   60-75 g Based on:   [x] Actual BW    Fluid:       1477-5486 ml Based on:   [x] Actual BW      [x] No Cultural, Voodoo or ethnic dietary need identified.     [] Cultural, Voodoo and ethnic food preferences identified and addressed     Wt Status:  [] Normal (18.6 - 24.9) [x] Underweight (<18.5) [] Overweight (25 - 29.9) [] Mild Obesity (30 - 34.9)  [] Moderate Obesity (35 - 39.9) [] Morbid Obesity (40+)     Nutrition Problems Identified:   [x] Suboptimal PO intake   [] Food Allergies  [] Difficulty chewing/swallowing/poor dentition  [] Constipation/Diarrhea   [] Nausea/Vomiting   [] None  [] Other:     Plan:   [] Therapeutic Diet  []  Obtained/adjusted food preferences/tolerances and/or snacks options   [x]  Supplements added   [] Occupational therapy following for feeding techniques  []  HS snack added   []  Modify diet texture   []  Modify diet for food allergies   []  Assist with menu selection   [x]  Monitor PO intake on meal rounds   [x]  Continue inpatient monitoring and intervention   []  Participated in discharge planning/Interdisciplinary rounds/Team meetings   []  Other:     Education Needs:   [] Not appropriate for teaching at this time due to:   [x] Identified and addressed    Nutrition Monitoring and Evaluation:  [x] Continue ongoing monitoring and intervention  [] [place]

## 2018-01-26 NOTE — H&P
[facility]  HISTORY AND PHYSICAL      Gus BLEDSOE  MR#: [id number]  : 1985  ACCOUNT #: [de-identified]   ADMIT DATE: 2018    CHIEF COMPLAINT:  Chest pain. HISTORY OF PRESENT ILLNESS:  This is a 28-year-old -American female with history of sickle cell disease who presented to the emergency department with rather generalized chest discomfort. The patient was found to be severely anemic with significantly elevated retic count, picture consistent with sickle cell crisis. Her chest x-ray showed no focal infiltrate, but with interstitial opacities confluent in the hilar region consistent with acute chest syndrome. Patient denies fevers, chills. She has had some increased cough recently. No dysuria, polyuria, hematuria. No vomiting, no abdominal pain. PAST MEDICAL HISTORY:  1. Sickle cell disease. 2.  Asthma. 3.  Vitamin D deficiency. 4.  Chronic methadone use. PAST SURGICAL HISTORY:  She had C-sections, cholecystectomy, hernia repair and tonsillectomy. ALLERGIES:  SHE IS ALLERGIC TO PENICILLIN. MEDICATIONS:    1. Apparently, the patient is on methadone 35 mg daily, according to her. 2.  Vitamin D 50,000 units once a week. 3.  Folic acid 1 daily. 4.  Hydroxyurea 1000 mg daily. SOCIAL HISTORY:  She is single. She has three children. She tells me that she does not smoke or drink. The patient has chronic opiate addiction history and she is on methadone as above. FAMILY HISTORY:  Positive for sickle cell disease in both sister and daughter. REVIEW OF SYSTEMS:  No fever, no chills. No abdominal pain, vomiting or diarrhea. No dysuria, polyuria, hematuria. The rest is as per history of the present illness or unremarkable. PHYSICAL EXAMINATION:  GENERAL:  Patient was in no distress. VITAL SIGNS:  Temperature 97.6, pulse 54, respirations 19. Blood pressure currently 127/55, earlier was down in the upper 70s and low 80s.   HEENT:  Atraumatic, normocephalic. Sclerae are anicteric. Throat clear. NECK:  No adenopathy or thyromegaly. LUNGS:  Few scattered rhonchi. HEART:  Regular rhythm, slightly bradycardic. ABDOMEN:  Soft, nontender, nondistended. EXTREMITIES:  No edema. NEUROLOGIC:  Nonfocal.    LABORATORY DATA:  CBC:  WBC 11.4, hemoglobin and hematocrit 4.5/12.0, platelets normal.  Reticulocyte count greater than 23. Urinalysis pending. Chemistry unremarkable. Pregnancy test negative. Blood cultures pending. Chest x-ray as above. EKG:  Nonspecific T-wave changes. ASSESSMENT:  1. Acute sickle cell crisis. 2.  Severe anemia due to above. 3.  Acute bronchitis. 4.  History of asthma. 5.  Chronic methadone therapy. 6.  As per past medical history. PLAN:  The patient received 1 unit packed red cells in the ER and the second one is pending. She will continue with IV hydration, pain management. Folic acid supplementation. DVT prophylaxis. Stress ulcer prophylaxis. Pain management with Toradol and Dilaudid as needed. We will consult with the methadone clinic in the morning regarding patient's therapy. Empiric antibiotics and bronchodilators. Further management accordingly. MD ROSE Jorgensen/[place]  D: 01/25/2018 20:00     T: 01/25/2018 20:34  JOB #: [id number]

## 2018-01-26 NOTE — PROGRESS NOTES
Problem: Falls - Risk of  Goal: *Absence of Falls  Document Ede Fall Risk and appropriate interventions in the flowsheet.    Outcome: Progressing Towards Goal  Fall Risk Interventions:            Medication Interventions: Patient to call before getting OOB, Teach patient to arise slowly

## 2018-01-27 PROCEDURE — 94640 AIRWAY INHALATION TREATMENT: CPT

## 2018-01-27 PROCEDURE — 94760 N-INVAS EAR/PLS OXIMETRY 1: CPT

## 2018-01-27 PROCEDURE — 74011250637 HC RX REV CODE- 250/637: Performed by: INTERNAL MEDICINE

## 2018-01-27 PROCEDURE — 74011000250 HC RX REV CODE- 250: Performed by: INTERNAL MEDICINE

## 2018-01-27 PROCEDURE — 65660000000 HC RM CCU STEPDOWN

## 2018-01-27 PROCEDURE — 74011250636 HC RX REV CODE- 250/636: Performed by: INTERNAL MEDICINE

## 2018-01-27 RX ORDER — LEVOFLOXACIN 250 MG/1
500 TABLET ORAL EVERY 24 HOURS
Status: DISCONTINUED | OUTPATIENT
Start: 2018-01-27 | End: 2018-01-28 | Stop reason: HOSPADM

## 2018-01-27 RX ORDER — HYDROMORPHONE HYDROCHLORIDE 2 MG/ML
0.5 INJECTION, SOLUTION INTRAMUSCULAR; INTRAVENOUS; SUBCUTANEOUS
Status: DISCONTINUED | OUTPATIENT
Start: 2018-01-27 | End: 2018-01-28 | Stop reason: HOSPADM

## 2018-01-27 RX ADMIN — LEVOFLOXACIN 500 MG: 250 TABLET, FILM COATED ORAL at 17:28

## 2018-01-27 RX ADMIN — KETOROLAC TROMETHAMINE 15 MG: 15 INJECTION, SOLUTION INTRAMUSCULAR; INTRAVENOUS at 00:04

## 2018-01-27 RX ADMIN — IPRATROPIUM BROMIDE AND ALBUTEROL SULFATE 3 ML: .5; 3 SOLUTION RESPIRATORY (INHALATION) at 08:47

## 2018-01-27 RX ADMIN — FOLIC ACID 1 MG: 1 TABLET ORAL at 09:41

## 2018-01-27 RX ADMIN — IPRATROPIUM BROMIDE AND ALBUTEROL SULFATE 3 ML: .5; 3 SOLUTION RESPIRATORY (INHALATION) at 13:16

## 2018-01-27 RX ADMIN — METHADONE HYDROCHLORIDE 35 MG: 10 CONCENTRATE ORAL at 12:11

## 2018-01-27 RX ADMIN — IPRATROPIUM BROMIDE AND ALBUTEROL SULFATE 3 ML: .5; 3 SOLUTION RESPIRATORY (INHALATION) at 21:39

## 2018-01-27 RX ADMIN — KETOROLAC TROMETHAMINE 15 MG: 15 INJECTION, SOLUTION INTRAMUSCULAR; INTRAVENOUS at 21:39

## 2018-01-27 NOTE — PROGRESS NOTES
Bedside shift change report given to Meryl HDEZ RN (oncoming nurse) by Vidya Gilliam RN (offgoing nurse). Report included the following information SBAR, Kardex, Intake/Output and MAR. 2040  Shift assessment completed. 2332  Pt sleeping. 0004  Medicated for pain    0050  Pain reassessed - pt sleeping. 0540  Pt asleep. O2 sats- 92%. Cardiac rhythm Sinus [facility]. 2900  0754  Bedside shift change report given to Doyle Sam RN (oncoming nurse) by Power Lares RN (offgoing nurse). Report included the following information SBAR, Kardex, Intake/Output and MAR.

## 2018-01-27 NOTE — PROGRESS NOTES
General Internal Medicine/Geriatrics    Patient: Shadi Nova MRN: [id number]  CSN: [id number]    YOB: 1985  Age: 34 y.o. Sex: female    DOA: 1/25/2018 LOS:  LOS: 2 days                    Subjective:     Pain some better  No new c/o    Review of Systems:  Eyes: negative  Ears, Nose, Mouth, Throat, and Face: negative  Respiratory: negative for dyspnea on exertion  Cardiovascular: negative for chest pain  Gastrointestinal: negative for nausea, vomiting and diarrhea  Genitourinary:negative for dysuria and hematuria  Integument/Breast: negative  Hematologic/Lymphatic: negative for bleeding  Musculoskeletal:negative for arthralgias  Neurological: negative for weakness  Behavioral/Psychiatric: negative for behavior problems  Endocrine: negative for temperature intolerance  Allergic/Immunologic: negative for hay fever    Objective:     Physical Exam:  Patient Vitals for the past 24 hrs:   Temp Pulse Resp BP SpO2   01/27/18 0930 98.5 °F (36.9 °C) 68 20 117/53 91 %   01/27/18 0848 - - - - 99 %   01/27/18 0540 - 70 - - 92 %   01/27/18 0403 98.4 °F (36.9 °C) (!) 55 16 134/58 (!) 89 %   01/27/18 0000 98.4 °F (36.9 °C) (!) 55 16 116/47 92 %   01/26/18 1937 97.8 °F (36.6 °C) 61 18 137/67 92 %   01/26/18 1616 97.8 °F (36.6 °C) 64 16 112/63 92 %     Af, VSS    HEENT: wnl  NECK:  No venous distention or adenopathy   HEART: RRR, no rubs or gallops  LUNGS: Clear to auscultation  ABDOMEN: soft with active BS. No tenderness, no distention, no organomegaly  EXT: warm,no edema  SKIN: Normal turgor, no breaks  NEURO: Awake, alert, non focal    Intake and Output:  Current Shift:  01/27 0701 - 01/27 1900  In: 240 [P.O.:240]  Out: -   Last three shifts:  01/25 1901 - 01/27 0700  In: 1570.8 [P.O.:1200; I.V.:100]  Out: -     No results found for this or any previous visit (from the past 24 hour(s)). No results found.     Current Facility-Administered Medications   Medication Dose Route Frequency    levoFLOXacin (LEVAQUIN) tablet 500 mg  500 mg Oral Q24H    HYDROmorphone (DILAUDID) injection 0.5 mg  0.5 mg IntraVENous Q8H PRN    methadone (DOLOPHINE) 10 mg/mL concentrated solution 35 mg  35 mg Oral DAILY    0.9% sodium chloride infusion 250 mL  250 mL IntraVENous PRN    acetaminophen (TYLENOL) tablet 650 mg  650 mg Oral Q6H PRN    heparin (porcine) injection 5,000 Units  5,000 Units SubCUTAneous G23U    folic acid (FOLVITE) tablet 1 mg  1 mg Oral DAILY    ketorolac (TORADOL) injection 15 mg  15 mg IntraVENous Q6H PRN    pantoprazole (PROTONIX) tablet 40 mg  40 mg Oral ACB    albuterol-ipratropium (DUO-NEB) 2.5 MG-0.5 MG/3 ML  3 mL Nebulization TID    guaiFENesin (ROBITUSSIN) 100 mg/5 mL oral liquid 100 mg  100 mg Oral Q4H PRN       Lab Results   Component Value Date/Time    Glucose 92 01/25/2018 11:57 AM    Glucose 110 12/02/2011 04:10 AM    Glucose 88 11/30/2011 07:13 PM        Assessment     Active Problems:    Sickle-cell anemia (HCC) (1/25/2018)      Sickle cell crisis acute chest syndrome ([address] (1/25/2018)      Asthma (1/26/2018)      HTN (hypertension) (1/26/2018)      Vitamin D deficiency (1/26/2018)        Plan     Hgb. Improved after transfusion, will repeat  Pain better  Methadone restarted  Decrease Dilaudid  Continue hydration  Continue other meds.           Timur Perera MD  1/27/2018, 5:05 PM

## 2018-01-27 NOTE — ROUTINE PROCESS
Bedside and Verbal shift change report given to [facility] (oncoming nurse) by Xiomara Betancourt (offgoing nurse). Report included the following information SBAR, Kardex, Intake/Output, MAR, Recent Results and Cardiac Rhythm SB/SR.     7645 Pt stated that she needed a copy of her MAR to give to the Methadone clinic at discharge. Informed pt that MAR cannot be printed and informed pt that all medications given and last dose given will be included with her discharge paperwork. Pt stated that per clinic this was not sufficient. Attained clinic information from pt to call. Will follow up. 1030 Pt stated that she takes her Methadone at 0500. Called pharmacy. Per pharmacy 1200 dose can be given today and message to change timing of next and future doses. Will follow up.     1100 Phoned [facility] Methadone clinic at [phone] to inquire about pt's methadone documentation at discharge. Spoke to [place] who stated that the pt's MAR could be faxed (fax # [phone]) at discharge to their clinic. She also stated that a release was likely on file from admission in order for pt to receive current Methadone doses while inpatient. She listed NEHA Yen as the lead nurse and point of contact for further questions. Verifed release signed by pt in chart. 1345 Pt resting in bed with no complaints of pain and no change to condition. Will continue to monitor. [id number] Pt asked if she could walk outside her room. Informed pt that she could as long as she did not leave the floor. Pt acknowledged. Upon leaving the unit pt was told that she could not leave the unit due to heart monitoring. Pt proceeded to leave. Code purple was called and pt returned to the floor shortly after. Pt then demanded to leave stating, \"If I can't go where I want to go then I am going home. Casie Mcfarland RN attempted to talk to pt. After attempting to talk to pt a second time pt stated that she was going home. AMA form was given to pt to sign. Telemetry box and PIV's were removed. Pt asked about discharge paperwork and was informed that it would not be given in this case because she was going AMA. Pt then changed her mind and said she may as well go home tomorrow. Pt states that she does not want to be bothered. Telemetry not reconnected at this point. Dr. Ana Kirk paged    Bedside and Verbal shift change report given to [address] [place] [address] (oncoming nurse) by Sarah Murray (offgoing nurse). Report included the following information SBAR, Kardex, Intake/Output, MAR, Recent Results and Cardiac Rhythm Telemetry removed. Charisse Patel

## 2018-01-27 NOTE — PROGRESS NOTES
Problem: Falls - Risk of  Goal: *Absence of Falls  Document Ede Fall Risk and appropriate interventions in the flowsheet.    Outcome: Progressing Towards Goal  Fall Risk Interventions:            Medication Interventions: Patient to call before getting OOB, Teach patient to arise slowly    Elimination Interventions: Call light in reach, Patient to call for help with toileting needs

## 2018-01-27 NOTE — PROGRESS NOTES
Day #2 of levofloxacin (day#3 of antibiotic therapy)    Indication:  Upper respiratory infection    Current regimen:  500 mg IV every 24 hours    Abx regimen: moxifloxacin 400 mg IV on day 1 then switched to levofloxacin 500 mg IV every 24 hours    Recent Labs      18   0548  18   1157   WBC  10.2  11.4   CREA   --   0.71   BUN   --   16     Est CrCl: 90 ml/min    Temp (24hrs), Av.1 °F (36.7 °C), Min:97.8 °F (36.6 °C), Max:98.5 °F (36.9 °C)      Plan: Change to levofloxacin 500 mg PO every 24 hours as patient is tolerating scheduled oral medications and diet. Please call pharmacy for questions.     Thanks,  Ashley Linn, PHARMD

## 2018-01-28 VITALS
DIASTOLIC BLOOD PRESSURE: 53 MMHG | WEIGHT: 112.1 LBS | HEIGHT: 67 IN | SYSTOLIC BLOOD PRESSURE: 127 MMHG | RESPIRATION RATE: 20 BRPM | HEART RATE: 55 BPM | TEMPERATURE: 97.8 F | OXYGEN SATURATION: 90 % | BODY MASS INDEX: 17.6 KG/M2

## 2018-01-28 LAB
BASOPHILS # BLD: 0.1 K/UL (ref 0–0.1)
BASOPHILS NFR BLD: 1 % (ref 0–3)
DIFFERENTIAL METHOD BLD: ABNORMAL
EOSINOPHIL # BLD: 0.2 K/UL (ref 0–0.4)
EOSINOPHIL NFR BLD: 2 % (ref 0–5)
ERYTHROCYTE [DISTWIDTH] IN BLOOD BY AUTOMATED COUNT: 21 % (ref 11.6–14.5)
HCT VFR BLD AUTO: 17.6 % (ref 35–45)
HGB BLD-MCNC: 6.5 G/DL (ref 12–16)
LYMPHOCYTES # BLD: 3.4 K/UL (ref 0.8–3.5)
LYMPHOCYTES NFR BLD: 37 % (ref 20–51)
MCH RBC QN AUTO: 31.1 PG (ref 24–34)
MCHC RBC AUTO-ENTMCNC: 36.9 G/DL (ref 31–37)
MCV RBC AUTO: 84.2 FL (ref 74–97)
MONOCYTES # BLD: 1 K/UL (ref 0–1)
MONOCYTES NFR BLD: 11 % (ref 2–9)
NEUTS SEG # BLD: 4.4 K/UL (ref 1.8–8)
NEUTS SEG NFR BLD: 49 % (ref 42–75)
NRBC BLD-RTO: 18 PER 100 WBC
PLATELET # BLD AUTO: 226 K/UL (ref 135–420)
PMV BLD AUTO: 9.1 FL (ref 9.2–11.8)
RBC # BLD AUTO: 2.09 M/UL (ref 4.2–5.3)
RBC MORPH BLD: ABNORMAL
WBC # BLD AUTO: 9.1 K/UL (ref 4.6–13.2)

## 2018-01-28 PROCEDURE — 74011250637 HC RX REV CODE- 250/637: Performed by: INTERNAL MEDICINE

## 2018-01-28 PROCEDURE — 36415 COLL VENOUS BLD VENIPUNCTURE: CPT | Performed by: INTERNAL MEDICINE

## 2018-01-28 PROCEDURE — 85025 COMPLETE CBC W/AUTO DIFF WBC: CPT | Performed by: INTERNAL MEDICINE

## 2018-01-28 RX ADMIN — FOLIC ACID 1 MG: 1 TABLET ORAL at 08:52

## 2018-01-28 RX ADMIN — METHADONE HYDROCHLORIDE 35 MG: 10 CONCENTRATE ORAL at 06:18

## 2018-01-28 NOTE — DISCHARGE SUMMARY
[address]    Dandre BLEDSOE  MR#: [id number]  : 1985  ACCOUNT #: [de-identified]   ADMIT DATE: 2018  DISCHARGE DATE: 2018    DISCHARGE DIAGNOSES:  1. Acute sickle cell crisis. 2.  Acute severe anemia due to above, status post transfusion of 2 units of packed red cells. 3.  History of asthma. 4.  History of hypertension. 5.  History of Vitamin D deficiency. 6.  As per past medical history. DISCHARGE MEDICATIONS:  The patient will resume all preadmit medication including her methadone as per history and physical.  She will follow up in the methadone clinic and with PCP. REASON FOR ADMISSION:  A 69-year-old -American female admitted with acute sickle cell crisis with hemoglobin of 4.4. For details, see admission history and physical.    The patient was admitted to monitored bed, she was treated with IV fluids. She was put back on her methadone dose in addition to p.r.n. Dilaudid and Toradol. Symptoms improved. She was transfused 2 units of packed red cells and her hemoglobin remained stable after that. Her pain has improved significantly and she is now being discharged on her usual medications. Today, her vital signs are stable. Blood pressure okay. Lungs clear, heart is regular. She is felt to have achieved maximum benefit of hospital stay and will be discharged home as above. MD ROSE Shoemaker/NÉSTOR  D: 2018 13:47     T: 2018 17:06  JOB #: [id number]

## 2018-01-28 NOTE — DISCHARGE INSTRUCTIONS
Patient armband removed and shredded. DISCHARGE SUMMARY from Nurse    PATIENT INSTRUCTIONS:    What to do at Home:  Recommended activity: Activity as tolerated. If you experience any of the following symptoms nausea, vomiting, diahrrea, difficulty breathing, shortness of breath, bleeding or temperature greater than 100.5 please follow up with your primary care provider of call 911. *  Please give a list of your current medications to your Primary Care Provider. *  Please update this list whenever your medications are discontinued, doses are      changed, or new medications (including over-the-counter products) are added. *  Please carry medication information at all times in case of emergency situations. These are general instructions for a healthy lifestyle:    No smoking/ No tobacco products/ Avoid exposure to second hand smoke  Surgeon General's Warning:  Quitting smoking now greatly reduces serious risk to your health. Obesity, smoking, and sedentary lifestyle greatly increases your risk for illness    A healthy diet, regular physical exercise & weight monitoring are important for maintaining a healthy lifestyle    You may be retaining fluid if you have a history of heart failure or if you experience any of the following symptoms:  Weight gain of 3 pounds or more overnight or 5 pounds in a week, increased swelling in our hands or feet or shortness of breath while lying flat in bed. Please call your doctor as soon as you notice any of these symptoms; do not wait until your next office visit. Recognize signs and symptoms of STROKE:    F-face looks uneven    A-arms unable to move or move unevenly    S-speech slurred or non-existent    T-time-call 911 as soon as signs and symptoms begin-DO NOT go       Back to bed or wait to see if you get better-TIME IS BRAIN. Warning Signs of HEART ATTACK     Call 911 if you have these symptoms:   Chest discomfort.  Most heart attacks involve discomfort in the center of the chest that lasts more than a few minutes, or that goes away and comes back. It can feel like uncomfortable pressure, squeezing, fullness, or pain.  Discomfort in other areas of the upper body. Symptoms can include pain or discomfort in one or both arms, the back, neck, jaw, or stomach.  Shortness of breath with or without chest discomfort.  Other signs may include breaking out in a cold sweat, nausea, or lightheadedness. Don't wait more than five minutes to call 911 - MINUTES MATTER! Fast action can save your life. Calling 911 is almost always the fastest way to get lifesaving treatment. Emergency Medical Services staff can begin treatment when they arrive -- up to an hour sooner than if someone gets to the hospital by car. The discharge information has been reviewed with the patient. The patient verbalized understanding. Discharge medications reviewed with the patient and appropriate educational materials and side effects teaching were provided.   ___________________________________________________________________________________________________________________________________

## 2018-01-28 NOTE — PROGRESS NOTES
Problem: Falls - Risk of  Goal: *Absence of Falls  Document Ede Fall Risk and appropriate interventions in the flowsheet.    Outcome: Progressing Towards Goal  Fall Risk Interventions:            Medication Interventions: Patient to call before getting OOB    Elimination Interventions: Call light in reach

## 2018-01-28 NOTE — ROUTINE PROCESS
Bedside and Verbal shift change report given to [facility] [facility] (oncoming nurse) by Xiomara Betancourt (offgoing nurse). Report included the following information SBAR, Kardex, Intake/Output, MAR, Recent Results and Cardiac Rhythm No telemetry monitoring. 0930 Received critical hematocrit of 17.6. Charted on the flowsheet and paged Dr. Frank Velez. 56 Per Dr. Frank Velez pt can be discharged now or she can wait for him. Pt stated that she wanted to wait for Dr. Frank Velez. Will continue to monitor. 1245 Pt resting in bed. Complaints of pain have been addressed. 1420 Pt discharged home. Discharge instructions completed, questions answered and clarifications provided. Pt given return to work note also. Pt's MAR faxed to [facility]. Release is signed and in chart.

## 2018-01-28 NOTE — PROGRESS NOTES
Problem: Falls - Risk of  Goal: *Absence of Falls  Document Ede Fall Risk and appropriate interventions in the flowsheet.    Outcome: Progressing Towards Goal  Fall Risk Interventions:            Medication Interventions: Bed/chair exit alarm, Patient to call before getting OOB, Teach patient to arise slowly    Elimination Interventions: Call light in reach, Patient to call for help with toileting needs

## 2018-01-31 LAB
BACTERIA SPEC CULT: NORMAL
BACTERIA SPEC CULT: NORMAL
SERVICE CMNT-IMP: NORMAL
SERVICE CMNT-IMP: NORMAL

## 2020-12-09 NOTE — PROGRESS NOTES
Bedside shift change report given to Meryl HDEZ RN (oncoming nurse) by Peyman Lange RN (offgoing nurse). Report included the following information SBAR, Kardex, Intake/Output and MAR. Pt upset and does not respond to the nurse. States that she does not want to be disturbed. 1950  Shift assessment completed. Refuses to answer pain assessment questions. 2139  Medicated for pain rated 8/10.    2204  Pain reassessed - 4/10 reported. 0128  Pt resting quietly in bed.    0615  Pt sleeping. Bedside shift change report given to [facility] MILDRED CHAUHAN RN (oncoming nurse) by Anirudh Amado RN (offgoing nurse). Report included the following information SBAR, Kardex, Intake/Output and MAR. (1) Oriented to own ability